# Patient Record
Sex: FEMALE | URBAN - METROPOLITAN AREA
[De-identification: names, ages, dates, MRNs, and addresses within clinical notes are randomized per-mention and may not be internally consistent; named-entity substitution may affect disease eponyms.]

---

## 2018-10-12 ENCOUNTER — OFFICE VISIT (OUTPATIENT)
Dept: NEUROLOGY | Age: 23
End: 2018-10-12

## 2018-10-12 VITALS
BODY MASS INDEX: 26.13 KG/M2 | WEIGHT: 142 LBS | SYSTOLIC BLOOD PRESSURE: 124 MMHG | RESPIRATION RATE: 18 BRPM | DIASTOLIC BLOOD PRESSURE: 86 MMHG | OXYGEN SATURATION: 98 % | HEIGHT: 62 IN | HEART RATE: 92 BPM

## 2018-10-12 DIAGNOSIS — R20.0 BILATERAL HAND NUMBNESS: Primary | ICD-10-CM

## 2018-10-12 DIAGNOSIS — Z82.0 FAMILY HISTORY OF MS (MULTIPLE SCLEROSIS): ICD-10-CM

## 2018-10-12 DIAGNOSIS — R20.2 PARESTHESIA OF BOTH LEGS: ICD-10-CM

## 2018-10-12 NOTE — PROGRESS NOTES
New patient here for evaluation of numbness in both hands and legs. Symptoms started about a month ago.

## 2018-10-12 NOTE — PROGRESS NOTES
Neurology Consult Note      HISTORY PROVIDED BY: patient    Chief Complaint:   Chief Complaint   Patient presents with    Numbness     in both hands and legs      Subjective:    Stephanie Porter is a 24 y.o. right handed female who presents in consultation for numbness in hands and legs. Pt is concerned she has MS, her PGF and a cousin have MS and she thinks her Dad may have MS, but he has not been evaluated. Pt reports that about a month ago she woke up with numbness in her hands bilaterally, only in 3-5th fingers, but since then she has had a \"buzzing\" sensation in hands in their entirety. Also developed this \"buzzing\" or pins and needles in bilateral legs for a couple of years, intermittent, but usually daily. No weakness, but mentions trouble holding a pencil in college. No change in b/b control, but has stress incontinence x years. No inciting events one month ago. No trauma. Two years ago, she passed out and her friend told her she was convulsing. Did not seek medical help. Was standing in friend's kitchen, had prodrome that she was going to passout - lightheadedness, dimming vision, sound in her ear. None since. No h/o seizures, no family h/o epilepsy. No other stroke like sxs. No h/o vision loss.      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    HX WISDOM TEETH EXTRACTION  2014      Social History     Social History    Marital status: SINGLE     Spouse name: N/A    Number of children: N/A    Years of education: N/A     Occupational History    Teacher - toddlers      Social History Main Topics    Smoking status: Current Every Day Smoker     Packs/day: 0.25     Years: 5.00    Smokeless tobacco: Never Used    Alcohol use 1.8 - 2.4 oz/week     3 - 4 Glasses of wine per week    Drug use: No    Sexual activity: Not on file     Other Topics Concern    Not on file     Social History Narrative    Lives in [address] [facility] with roommate     Family History   Problem Relation Age of Onset    Other Mother      Hearing loss, has cochlear implants    Other Father      Colon polyps    Psychiatric Disorder Sister    Gabriel Payne Migraines Sister      After MVAs    MS Paternal Grandfather     MS Cousin          Objective:   Review of Systems   Constitutional: Positive for malaise/fatigue. HENT: Negative. Eyes: Negative. Respiratory: Positive for cough. Cardiovascular: Negative. Gastrointestinal: Positive for abdominal pain, constipation, diarrhea, nausea and vomiting. Genitourinary: Negative. Musculoskeletal: Positive for myalgias. Skin: Negative. Neurological: Positive for sensory change. Endo/Heme/Allergies: Negative. Psychiatric/Behavioral: Positive for depression and memory loss. The patient is nervous/anxious. No Known Allergies     Meds:    Current Outpatient Prescriptions:     INTRAUTERINE DEVICE, IUD, IU, by IntraUTERine route., Disp: , Rfl:     Imaging:  MRI Results (most recent):  No results found for this or any previous visit. CT Results (most recent):  No results found for this or any previous visit. Reviewed records in [employer] and [employer] tab today    Lab Review   No results found for this or any previous visit. Exam:  Visit Vitals    /86    Pulse 92    Resp 18    Ht 5' 2\" (1.575 m)    Wt 64.4 kg (142 lb)    SpO2 98%    BMI 25.97 kg/m2     General:  Alert, cooperative, no distress. Head:  Normocephalic, without obvious abnormality, atraumatic. Respiratory:  Heart:   Non labored breathing  Regular rate and rhythm, no murmurs   Neck:   2+ carotids, no bruits   Extremities: Warm, no cyanosis or edema. Pulses: 2+ radial pulses. Neurologic:  MS: Alert and oriented x 4, speech intact. Language intact. Attention and fund of knowledge appropriate. Recent and remote memory intact.   Cranial Nerves:  II: visual fields Full to confrontation   II: pupils Equal, round, reactive to light   II: optic disc    III,VII: ptosis none   III,IV,VI: extraocular muscles  EOMI, no nystagmus or diplopia   V: facial light touch sensation  normal   VII: facial muscle function   symmetric   VIII: hearing intact   IX: soft palate elevation  normal   XI: trapezius strength  5/5   XI: sternocleidomastoid strength 5/5   XII: tongue  Midline     Motor: normal bulk and tone, postural and action tremors in bilateral hands - chronic per pt, Strength: 5/5 throughout, no PD  Sensory: intact to LT, PP, vibratory sensation intact  Coordination: FTN and HTS intact, DARIA intact  Gait: normal gait, able to heel, toe, and tandem walk  Reflexes: 3+ symmetric, toes downgoing           Assessment/Plan   Pt is a 24 y.o. right handed female with sudden onset of bilateral hand numbness, then pins and needles, one month ago that is persistent, and history of intermittent pins and needles sensation in bilateral legs for the last couple of years. Stress incontinence. Pt has strong family h/o MS in PGF, paternal cousin, and possibly her father. Exam with brisk reflexes throughout, o/w non-focal.  Sudden onset of bilateral hand numbness raises suspicion for cervical cord lesion. Cervical cord lesion could explain lower extremity symptoms or may have had a lower cord lesion in the past. She does have brisk reflexes. Recommend starting her evaluation with MRI of the cervical spine w/wo contrast. F/u pending results, will contact pt by phone after MRI completed. ICD-10-CM ICD-9-CM    1. Bilateral hand numbness R20.0 782.0 MRI CERV SPINE W WO CONT   2. Family history of MS (multiple sclerosis) Z82.0 V17.2 MRI CERV SPINE W WO CONT   3. Paresthesia of both legs R20.2 782.0 MRI CERV SPINE W WO CONT       Signed:   Zakia Osei MD  10/12/2018

## 2018-10-12 NOTE — PATIENT INSTRUCTIONS

## 2018-10-12 NOTE — MR AVS SNAPSHOT
[facility], [place] [address] [place] [address] 
[phone] Patient: Francois Grimaldo MRN: [id number] JSP:[id number] Visit Information Date & Time Provider Department Dept. Phone Encounter #  
 10/12/2018  8:00 AM Aden Quinones MD [facility] [place] [id number] [phone] Follow-up Instructions Return if symptoms worsen or fail to improve. Allergies as of 10/12/2018  Review Complete On: 10/12/2018 By: Aden Quinones MD  
 No Known Allergies Current Immunizations  Never Reviewed No immunizations on file. Not reviewed this visit You Were Diagnosed With   
  
 Codes Comments Bilateral hand numbness    -  Primary ICD-10-CM: R20.0 ICD-9-CM: 782.0 Family history of MS (multiple sclerosis)     ICD-10-CM: Z82.0 ICD-9-CM: V17.2 Paresthesia of both legs     ICD-10-CM: R20.2 ICD-9-CM: 257. 0 Vitals BP Pulse Resp Height(growth percentile) Weight(growth percentile) SpO2  
 124/86 92 18 5' 2\" (1.575 m) 142 lb (64.4 kg) 98% BMI Smoking Status 25.97 kg/m2 Current Every Day Smoker Vitals History BMI and BSA Data Body Mass Index Body Surface Area  
 25.97 kg/m 2 1.68 m 2 Preferred Pharmacy Pharmacy Name Phone [id number] [phone] Your Updated Medication List  
  
   
This list is accurate as of 10/12/18  8:45 AM.  Always use your most recent med list.  
  
  
  
  
 INTRAUTERINE DEVICE (IUD) IU  
by IntraUTERine route. Follow-up Instructions Return if symptoms worsen or fail to improve. To-Do List   
 10/18/2018 Imaging:  MRI CERV SPINE W WO CONT Patient Instructions A Healthy Lifestyle: Care Instructions Your Care Instructions A healthy lifestyle can help you feel good, stay at a healthy weight, and have plenty of energy for both work and play. A healthy lifestyle is something you can share with your whole family. A healthy lifestyle also can lower your risk for serious health problems, such as high blood pressure, heart disease, and diabetes. You can follow a few steps listed below to improve your health and the health of your family. Follow-up care is a key part of your treatment and safety. Be sure to make and go to all appointments, and call your doctor if you are having problems. It's also a good idea to know your test results and keep a list of the medicines you take. How can you care for yourself at home? · Do not eat too much sugar, fat, or fast foods. You can still have dessert and treats now and then. The goal is moderation. · Start small to improve your eating habits. Pay attention to portion sizes, drink less juice and soda pop, and eat more fruits and vegetables. ¨ Eat a healthy amount of food. A 3-ounce serving of meat, for example, is about the size of a deck of cards. Fill the rest of your plate with vegetables and whole grains. ¨ Limit the amount of soda and sports drinks you have every day. Drink more water when you are thirsty. ¨ Eat at least 5 servings of fruits and vegetables every day. It may seem like a lot, but it is not hard to reach this goal. A serving or helping is 1 piece of fruit, 1 cup of vegetables, or 2 cups of leafy, raw vegetables. Have an apple or some carrot sticks as an afternoon snack instead of a candy bar. Try to have fruits and/or vegetables at every meal. 
· Make exercise part of your daily routine. You may want to start with simple activities, such as walking, bicycling, or slow swimming. Try to be active 30 to 60 minutes every day. You do not need to do all 30 to 60 minutes all at once. For example, you can exercise 3 times a day for 10 or 20 minutes.  Moderate exercise is safe for most people, but it is always a good idea to talk to your doctor before starting an exercise program. 
· Keep moving. Kristine Millsle the lawn, work in the garden, or [employer]. Take the stairs instead of the elevator at work. · If you smoke, quit. People who smoke have an increased risk for heart attack, stroke, cancer, and other lung illnesses. Quitting is hard, but there are ways to boost your chance of quitting tobacco for good. ¨ Use nicotine gum, patches, or lozenges. ¨ Ask your doctor about stop-smoking programs and medicines. ¨ Keep trying. In addition to reducing your risk of diseases in the future, you will notice some benefits soon after you stop using tobacco. If you have shortness of breath or asthma symptoms, they will likely get better within a few weeks after you quit. · Limit how much alcohol you drink. Moderate amounts of alcohol (up to 2 drinks a day for men, 1 drink a day for women) are okay. But drinking too much can lead to liver problems, high blood pressure, and other health problems. Family health If you have a family, there are many things you can do together to improve your health. · Eat meals together as a family as often as possible. · Eat healthy foods. This includes fruits, vegetables, lean meats and dairy, and whole grains. · Include your family in your fitness plan. Most people think of activities such as jogging or tennis as the way to fitness, but there are many ways you and your family can be more active. Anything that makes you breathe hard and gets your heart pumping is exercise. Here are some tips: 
¨ Walk to do errands or to take your child to school or the bus. ¨ Go for a family bike ride after dinner instead of watching TV. Where can you learn more? Go to http://daksha-yash.info/. Enter B893 in the search box to learn more about \"A Healthy Lifestyle: Care Instructions. \" Current as of: December 7, 2017 Content Version: 11.8 © 2737-2539 [employer]. Care instructions adapted under license by "[employer] (which disclaims liability or warranty for this information). If you have questions about a medical condition or this instruction, always ask your healthcare professional. [address] any warranty or liability for your use of this information. Introducing [facility]! [facility] introduces [employer] patient portal. Now you can access parts of your medical record, email your doctor's office, and request medication refills online. 1. In your internet browser, go to [email or web address] 2. Click on the First Time User? Click Here link in the Sign In box. You will see the New Member Sign Up page. 3. Enter your [employer] Access Code exactly as it appears below. You will not need to use this code after youve completed the sign-up process. If you do not sign up before the expiration date, you must request a new code. · [employer] Access Code: [email or web address] Expires: 1/10/2019  7:36 AM 
 
4. Enter the last four digits of your Social Security Number (xxxx) and Date of Birth (mm/dd/yyyy) as indicated and click Submit. You will be taken to the next sign-up page. 5. Create a Fundera ID. This will be your [employer] login ID and cannot be changed, so think of one that is secure and easy to remember. 6. Create a [employer] password. You can change your password at any time. 7. Enter your Password Reset Question and Answer. This can be used at a later time if you forget your password. 8. Enter your e-mail address. You will receive e-mail notification when new information is available in [address] 9. Click Sign Up. You can now view and download portions of your medical record. 10. Click the Download Summary menu link to download a portable copy of your medical information.  
 
If you have questions, please visit the Frequently Asked Questions section of the [employer]. Remember, [employer] is NOT to be used for urgent needs. For medical emergencies, dial 911. Now available from your iPhone and Android! Please provide this summary of care documentation to your next provider. If you have any questions after today's visit, please call [phone].